# Patient Record
Sex: FEMALE | Race: WHITE | NOT HISPANIC OR LATINO | Employment: OTHER | URBAN - METROPOLITAN AREA
[De-identification: names, ages, dates, MRNs, and addresses within clinical notes are randomized per-mention and may not be internally consistent; named-entity substitution may affect disease eponyms.]

---

## 2017-01-05 ENCOUNTER — OFFICE VISIT (OUTPATIENT)
Dept: ENDOCRINOLOGY | Facility: CLINIC | Age: 77
End: 2017-01-05

## 2017-01-05 VITALS
WEIGHT: 177 LBS | DIASTOLIC BLOOD PRESSURE: 76 MMHG | SYSTOLIC BLOOD PRESSURE: 128 MMHG | OXYGEN SATURATION: 96 % | HEART RATE: 64 BPM | BODY MASS INDEX: 28.45 KG/M2 | HEIGHT: 66 IN

## 2017-01-05 DIAGNOSIS — E55.9 VITAMIN D DEFICIENCY: ICD-10-CM

## 2017-01-05 DIAGNOSIS — E03.9 ACQUIRED HYPOTHYROIDISM: Primary | ICD-10-CM

## 2017-01-05 LAB
25(OH)D3 SERPL-MCNC: 48.1 NG/ML
ALBUMIN SERPL-MCNC: 4.2 G/DL (ref 3.2–4.8)
ALBUMIN/GLOB SERPL: 1.5 G/DL (ref 1.5–2.5)
ALP SERPL-CCNC: 108 U/L (ref 25–100)
ALT SERPL W P-5'-P-CCNC: 19 U/L (ref 7–40)
ANION GAP SERPL CALCULATED.3IONS-SCNC: 8 MMOL/L (ref 3–11)
AST SERPL-CCNC: 26 U/L (ref 0–33)
BILIRUB SERPL-MCNC: 0.4 MG/DL (ref 0.3–1.2)
BUN BLD-MCNC: 21 MG/DL (ref 9–23)
BUN/CREAT SERPL: 26.3 (ref 7–25)
CALCIUM SPEC-SCNC: 10.1 MG/DL (ref 8.7–10.4)
CHLORIDE SERPL-SCNC: 103 MMOL/L (ref 99–109)
CO2 SERPL-SCNC: 29 MMOL/L (ref 20–31)
CREAT BLD-MCNC: 0.8 MG/DL (ref 0.6–1.3)
GFR SERPL CREATININE-BSD FRML MDRD: 70 ML/MIN/1.73
GLOBULIN UR ELPH-MCNC: 2.8 GM/DL
GLUCOSE BLD-MCNC: 95 MG/DL (ref 70–100)
POTASSIUM BLD-SCNC: 4.3 MMOL/L (ref 3.5–5.5)
PROT SERPL-MCNC: 7 G/DL (ref 5.7–8.2)
SODIUM BLD-SCNC: 140 MMOL/L (ref 132–146)
T4 FREE SERPL-MCNC: 1.38 NG/DL (ref 0.89–1.76)
TSH SERPL DL<=0.05 MIU/L-ACNC: 2.89 MIU/ML (ref 0.35–5.35)

## 2017-01-05 PROCEDURE — 84439 ASSAY OF FREE THYROXINE: CPT | Performed by: INTERNAL MEDICINE

## 2017-01-05 PROCEDURE — 99213 OFFICE O/P EST LOW 20 MIN: CPT | Performed by: INTERNAL MEDICINE

## 2017-01-05 PROCEDURE — 80053 COMPREHEN METABOLIC PANEL: CPT | Performed by: INTERNAL MEDICINE

## 2017-01-05 PROCEDURE — 82306 VITAMIN D 25 HYDROXY: CPT | Performed by: INTERNAL MEDICINE

## 2017-01-05 PROCEDURE — 84443 ASSAY THYROID STIM HORMONE: CPT | Performed by: INTERNAL MEDICINE

## 2017-01-05 RX ORDER — ESTRADIOL 0.5 MG/1
TABLET ORAL
COMMUNITY
Start: 2016-12-19 | End: 2017-08-10

## 2017-01-05 NOTE — MR AVS SNAPSHOT
Isabel Khan   2017 10:15 AM   Office Visit    Dept Phone:  [phone]   Encounter #:  [id number]    Provider:  Carmelina NUR MD   Department:  [facility] INTERNAL MEDICINE AND ENDOCRINOLOGY                Your Full Care Plan              Your Updated Medication List          This list is accurate as of: 17 10:48 AM.  Always use your most recent med list.                ADVIL 200 MG capsule   Generic drug:  Ibuprofen       estradiol 0.5 MG tablet   Commonly known as:  ESTRACE       levothyroxine 112 MCG tablet   Commonly known as:  SYNTHROID   Take 1 tablet by mouth daily. Please substitute to generic       omeprazole 40 MG capsule   Commonly known as:  priLOSEC       oxybutynin 5 MG tablet   Commonly known as:  DITROPAN       sertraline 50 MG tablet   Commonly known as:  ZOLOFT       ZYRTEC ALLERGY 10 MG tablet   Generic drug:  cetirizine               We Performed the Following     Comprehensive Metabolic Panel     T4, Free     TSH     Vitamin D 25 Hydroxy       You Were Diagnosed With        Codes Comments    Acquired hypothyroidism    -  Primary ICD-10-CM: E03.9  ICD-9-CM: 244.9     Vitamin D deficiency     ICD-10-CM: E55.9  ICD-9-CM: 268.9       Instructions     None    Patient Instructions History      Upcoming Appointments     Visit Type Date Time Department    FOLLOW UP 2017 10:15 AM [facility]    FOLLOW UP 8/10/2017 11:15 AM [facility] Signup     [facility] [employer] allows you to send messages to your doctor, view your test results, renew your prescriptions, schedule appointments, and more. To sign up, go to [employer] and click on the Sign Up Now link in the New User? box. Enter your [employer] Activation Code exactly as it appears below along with the last four digits of your Social Security Number and your Date of Birth () to complete the sign-up process. If you do not sign up before the expiration date, you  "must request a new code.    [employer] Activation Code: [email or web address]  Expires: 1/19/2017 10:48 AM    If you have questions, you can email [email or web address] or call [phone] to talk to our [employer] staff. Remember, [employer] is NOT to be used for urgent needs. For medical emergencies, dial 911.               Other Info from Your Visit           Your Appointments     Aug 10, 2017 11:15 AM EDT   Follow Up with Carmelina NUR MD   [facility] INTERNAL MEDICINE AND ENDOCRINOLOGY (--)    [address] [place] [id number] [phone]           Arrive 15 minutes prior to appointment.              Allergies     Influenza Vaccines        Reason for Visit     Hypothyroidism f/u for hypothyroidism. no recent labs. c/o of feeling fatigued.       Vital Signs     Blood Pressure Pulse Height Weight Oxygen Saturation Body Mass Index    128/76 64 66\" (167.6 cm) 177 lb (80.3 kg) 96% 28.57 kg/m2    Smoking Status                   Never Smoker           Problems and Diagnoses Noted     Underactive thyroid    Vitamin D deficiency          No Longer an Issue     Hormone replacement therapy      Results         "

## 2017-01-05 NOTE — PROGRESS NOTES
Hypothyroidism (f/u for hypothyroidism. no recent labs. c/o of feeling fatigued. )    Subjective   Isabel Khan is a 76 y.o. female is here today for follow-up.  Here in f/u hypothyroidism initially diagnosed in 1967 X 49 yrs  Currently taking levothyroxine 112 mcg.   TSH was 0.6 in Dec 2015.   Feels better and less tired, doesn't have to travel as much.     Postmenopausal on HRT - we are titrating down the dose and she stopped it. Sx recurred and we restarted at a lower dose. 0.25 mg every other day.  She is taking 2000 IU Vit D daily.      pt retired . Helps take care of  and feels tired.       History of Present Illness    Current Outpatient Prescriptions:   •  cetirizine (ZYRTEC ALLERGY) 10 MG tablet, Take  by mouth., Disp: , Rfl:   •  estradiol (ESTRACE) 0.5 MG tablet, , Disp: , Rfl:   •  Ibuprofen (ADVIL) 200 MG capsule, Take  by mouth., Disp: , Rfl:   •  levothyroxine (SYNTHROID) 112 MCG tablet, Take 1 tablet by mouth daily. Please substitute to generic, Disp: 30 tablet, Rfl: 11  •  omeprazole (PriLOSEC) 40 MG capsule, Take 1 capsule by mouth daily., Disp: , Rfl:   •  oxybutynin (DITROPAN) 5 MG tablet, Take  by mouth., Disp: , Rfl:   •  sertraline (ZOLOFT) 50 MG tablet, Take 0.5 tablets by mouth every night., Disp: , Rfl:       The following portions of the patient's history were reviewed and updated as appropriate: allergies, current medications, past family history, past medical history, past social history, past surgical history and problem list.    Review of Systems   Constitutional: Positive for fatigue and unexpected weight change (weight gain). Negative for activity change and appetite change.   HENT: Negative for ear pain, facial swelling, hearing loss, postnasal drip, trouble swallowing and voice change.    Eyes: Negative.  Negative for redness, itching and visual disturbance.   Respiratory: Positive for shortness of breath.    Cardiovascular: Negative for palpitations and leg  "swelling.   Gastrointestinal: Negative for abdominal distention, constipation and diarrhea.   Endocrine:        As listed in HPI   Genitourinary: Negative.    Musculoskeletal: Negative for back pain and neck stiffness.   Skin: Negative.    Allergic/Immunologic: Negative.    Neurological: Negative for dizziness, tremors, syncope and light-headedness.   Hematological: Negative.    Psychiatric/Behavioral: Negative.         Depressed   All other systems reviewed and are negative.      Objective   Blood pressure 128/76, pulse 64, height 66\" (167.6 cm), weight 177 lb (80.3 kg), SpO2 96 %. Physical Exam   Constitutional: She is oriented to person, place, and time. She appears well-developed and well-nourished.   HENT:   Head: Normocephalic and atraumatic.   Eyes: Conjunctivae are normal.   Neck: Normal range of motion. Neck supple. No thyromegaly present.   Cardiovascular: Normal rate, regular rhythm and normal heart sounds.    Pulmonary/Chest: Effort normal and breath sounds normal.   Musculoskeletal: Normal range of motion. She exhibits no edema, tenderness or deformity.   Lymphadenopathy:     She has no cervical adenopathy.   Neurological: She is alert and oriented to person, place, and time. She has normal reflexes.   Skin: Skin is warm and dry. No rash noted.   Psychiatric: She has a normal mood and affect.   Nursing note and vitals reviewed.        This SmartLink has not been configured with any valid records.     Results for orders placed or performed during the hospital encounter of 05/04/16   TSH   Result Value Ref Range    TSH 2.090 0.350 - 5.350 UIU/mL   T4, free   Result Value Ref Range    Free T4 1.09 0.89 - 1.76 ng/dL         Assessment/Plan   Problem List Items Addressed This Visit        Endocrine    Hypothyroidism - Primary        Doing well, repeat labs and cont 112 mcg levothyroxine.   Recommended to titrate off the estradiol. Cont Vit D   Labs today.                "

## 2017-01-09 ENCOUNTER — TELEPHONE (OUTPATIENT)
Dept: ENDOCRINOLOGY | Facility: CLINIC | Age: 77
End: 2017-01-09

## 2017-01-09 NOTE — TELEPHONE ENCOUNTER
----- Message from Carmelina NUR MD sent at 1/9/2017 10:41 AM EST -----  Lab letter created. Please print it out and mail to her

## 2017-05-30 DIAGNOSIS — E03.9 ACQUIRED HYPOTHYROIDISM: ICD-10-CM

## 2017-05-30 RX ORDER — LEVOTHYROXINE SODIUM 112 UG/1
TABLET ORAL
Qty: 30 TABLET | Refills: 0 | Status: SHIPPED | OUTPATIENT
Start: 2017-05-30 | End: 2017-06-30 | Stop reason: SDUPTHER

## 2017-06-30 DIAGNOSIS — E03.9 ACQUIRED HYPOTHYROIDISM: ICD-10-CM

## 2017-06-30 RX ORDER — LEVOTHYROXINE SODIUM 112 UG/1
TABLET ORAL
Qty: 30 TABLET | Refills: 0 | Status: SHIPPED | OUTPATIENT
Start: 2017-06-30 | End: 2017-08-01 | Stop reason: SDUPTHER

## 2017-08-01 DIAGNOSIS — E03.9 ACQUIRED HYPOTHYROIDISM: ICD-10-CM

## 2017-08-02 RX ORDER — LEVOTHYROXINE SODIUM 112 UG/1
TABLET ORAL
Qty: 30 TABLET | Refills: 0 | Status: SHIPPED | OUTPATIENT
Start: 2017-08-02 | End: 2017-08-10 | Stop reason: SDUPTHER

## 2017-08-10 ENCOUNTER — OFFICE VISIT (OUTPATIENT)
Dept: ENDOCRINOLOGY | Facility: CLINIC | Age: 77
End: 2017-08-10

## 2017-08-10 VITALS
SYSTOLIC BLOOD PRESSURE: 122 MMHG | WEIGHT: 177.4 LBS | BODY MASS INDEX: 28.51 KG/M2 | HEIGHT: 66 IN | DIASTOLIC BLOOD PRESSURE: 78 MMHG | OXYGEN SATURATION: 98 % | HEART RATE: 68 BPM

## 2017-08-10 DIAGNOSIS — E03.9 ACQUIRED HYPOTHYROIDISM: Primary | ICD-10-CM

## 2017-08-10 LAB
T4 FREE SERPL-MCNC: 1.41 NG/DL (ref 0.89–1.76)
TSH SERPL DL<=0.05 MIU/L-ACNC: 0.49 MIU/ML (ref 0.35–5.35)

## 2017-08-10 PROCEDURE — 84439 ASSAY OF FREE THYROXINE: CPT | Performed by: INTERNAL MEDICINE

## 2017-08-10 PROCEDURE — 84443 ASSAY THYROID STIM HORMONE: CPT | Performed by: INTERNAL MEDICINE

## 2017-08-10 PROCEDURE — 99213 OFFICE O/P EST LOW 20 MIN: CPT | Performed by: INTERNAL MEDICINE

## 2017-08-10 RX ORDER — LEVOTHYROXINE SODIUM 112 UG/1
112 TABLET ORAL DAILY
Qty: 30 TABLET | Refills: 11 | Status: SHIPPED | OUTPATIENT
Start: 2017-08-10 | End: 2018-09-01 | Stop reason: SDUPTHER

## 2017-08-10 NOTE — PROGRESS NOTES
Hypothyroidism (F/u for hypothyroidism, C/o feeling more tired and fatigued than usual)    Subjective   Isabel Khan is a 77 y.o. female is here today for follow-up.  Here in f/u hypothyroidism initially diagnosed in 1967 X 49 yrs  Currently taking levothyroxine 112 mcg.   TSH was 0.6 in Dec 2015.   Feels better and less tired, doesn't have to travel as much.     Postmenopausal on HRT - estrogen was slowly discontinued,uneventful. 0.25 mg every other day.  She is taking 2000 IU Vit D daily.      She had 2 falls, tripped over the oxygen cord on the carpet, a week later she had another [place] - [place] the mall on [place]. It took longer to heal from this. No fractures.  Had hurt breast and still has soreness in one spot on the left. Estrace was slowly discontinued.     pt retired .      Hypothyroidism   Associated symptoms include fatigue.       Current Outpatient Prescriptions:   •  cetirizine (ZYRTEC ALLERGY) 10 MG tablet, Take  by mouth., Disp: , Rfl:   •  Ibuprofen (ADVIL) 200 MG capsule, Take  by mouth., Disp: , Rfl:   •  levothyroxine (SYNTHROID) 112 MCG tablet, Take 1 tablet by mouth Daily. Please substitute to generic, Disp: 30 tablet, Rfl: 11  •  omeprazole (PriLOSEC) 40 MG capsule, Take 1 capsule by mouth daily., Disp: , Rfl:   •  oxybutynin (DITROPAN) 5 MG tablet, Take  by mouth., Disp: , Rfl:   •  sertraline (ZOLOFT) 50 MG tablet, Take 0.5 tablets by mouth every night., Disp: , Rfl:       The following portions of the patient's history were reviewed and updated as appropriate: allergies, current medications, past family history, past medical history, past social history, past surgical history and problem list.    Review of Systems   Constitutional: Positive for fatigue and unexpected weight change (weight gain). Negative for activity change and appetite change.   HENT: Negative for ear pain, facial swelling, hearing loss, postnasal drip, trouble swallowing and voice change.    Eyes: Negative.  " Negative for redness, itching and visual disturbance.   Cardiovascular: Negative for palpitations and leg swelling.   Gastrointestinal: Negative for abdominal distention, constipation and diarrhea.   Endocrine:        As listed in HPI   Genitourinary: Negative.    Musculoskeletal: Negative for back pain and neck stiffness.   Skin: Negative.    Allergic/Immunologic: Negative.    Neurological: Negative for dizziness, tremors, syncope and light-headedness.   Hematological: Negative.    Psychiatric/Behavioral: Negative.         Depressed   All other systems reviewed and are negative.      Objective   Blood pressure 122/78, pulse 68, height 66\" (167.6 cm), weight 177 lb 6.4 oz (80.5 kg), SpO2 98 %. Physical Exam   Constitutional: She is oriented to person, place, and time. She appears well-developed and well-nourished.   HENT:   Head: Normocephalic and atraumatic.   Eyes: Conjunctivae are normal.   Neck: Normal range of motion. Neck supple. No thyromegaly present.   Cardiovascular: Normal rate, regular rhythm and normal heart sounds.    Pulmonary/Chest: Effort normal and breath sounds normal.   Musculoskeletal: Normal range of motion. She exhibits no edema, tenderness or deformity.   Lymphadenopathy:     She has no cervical adenopathy.   Neurological: She is alert and oriented to person, place, and time. She has normal reflexes.   Skin: Skin is warm and dry. No rash noted.   Psychiatric: She has a normal mood and affect.   Nursing note and vitals reviewed.        This SmartLink has not been configured with any valid records.     Results for orders placed or performed in visit on 08/10/17   TSH   Result Value Ref Range    TSH 0.492 0.350 - 5.350 mIU/mL   T4, Free   Result Value Ref Range    Free T4 1.41 0.89 - 1.76 ng/dL         Assessment/Plan   Problem List Items Addressed This Visit        Endocrine    Hypothyroidism - Primary    Relevant Medications    levothyroxine (SYNTHROID) 112 MCG tablet    Other Relevant Orders "    TSH (Completed)    T4, Free (Completed)        Doing well, repeat labs and cont 112 mcg levothyroxine.    Cont Vit D   Labs today.

## 2018-09-01 DIAGNOSIS — E03.9 ACQUIRED HYPOTHYROIDISM: ICD-10-CM

## 2018-09-01 RX ORDER — LEVOTHYROXINE SODIUM 112 UG/1
TABLET ORAL
Qty: 30 TABLET | Refills: 0 | Status: SHIPPED | OUTPATIENT
Start: 2018-09-01 | End: 2018-10-04 | Stop reason: SDUPTHER

## 2018-10-04 DIAGNOSIS — E03.9 ACQUIRED HYPOTHYROIDISM: ICD-10-CM

## 2018-10-04 RX ORDER — LEVOTHYROXINE SODIUM 112 UG/1
TABLET ORAL
Qty: 30 TABLET | Refills: 0 | Status: SHIPPED | OUTPATIENT
Start: 2018-10-04 | End: 2018-10-31 | Stop reason: SDUPTHER

## 2018-10-31 DIAGNOSIS — E03.9 ACQUIRED HYPOTHYROIDISM: ICD-10-CM

## 2018-10-31 RX ORDER — LEVOTHYROXINE SODIUM 112 UG/1
112 TABLET ORAL DAILY
Qty: 30 TABLET | Refills: 0 | Status: SHIPPED | OUTPATIENT
Start: 2018-10-31 | End: 2018-12-12 | Stop reason: SDUPTHER

## 2018-12-12 DIAGNOSIS — E03.9 ACQUIRED HYPOTHYROIDISM: ICD-10-CM

## 2018-12-12 RX ORDER — LEVOTHYROXINE SODIUM 112 UG/1
TABLET ORAL
Qty: 15 TABLET | Refills: 0 | Status: SHIPPED | OUTPATIENT
Start: 2018-12-12